# Patient Record
Sex: FEMALE | Race: ASIAN | Employment: UNEMPLOYED | URBAN - METROPOLITAN AREA
[De-identification: names, ages, dates, MRNs, and addresses within clinical notes are randomized per-mention and may not be internally consistent; named-entity substitution may affect disease eponyms.]

---

## 2018-08-31 ENCOUNTER — OFFICE VISIT (OUTPATIENT)
Dept: FAMILY MEDICINE CLINIC | Facility: CLINIC | Age: 48
End: 2018-08-31

## 2018-08-31 VITALS
HEART RATE: 88 BPM | BODY MASS INDEX: 22.02 KG/M2 | HEIGHT: 64 IN | OXYGEN SATURATION: 98 % | RESPIRATION RATE: 12 BRPM | TEMPERATURE: 98 F | SYSTOLIC BLOOD PRESSURE: 104 MMHG | DIASTOLIC BLOOD PRESSURE: 74 MMHG | WEIGHT: 129 LBS

## 2018-08-31 DIAGNOSIS — Z02.89 ENCOUNTER FOR PHYSICAL EXAMINATION RELATED TO EMPLOYMENT: Primary | ICD-10-CM

## 2018-08-31 PROCEDURE — 99386 PREV VISIT NEW AGE 40-64: CPT | Performed by: NURSE PRACTITIONER

## 2018-08-31 NOTE — PATIENT INSTRUCTIONS
Adult Immunization Schedule  Vaccine How often Disease prevented Who needs it   Influenza Every year Flu. This can be especially dangerous to elderly adults or people with immune disorders.  All adults   Tetanus, diphtheria (Td); or Tetanus, diphtheria, a ---You should not get the live vaccine if your immune system is low. For example, if you have HIV, are taking medicines that suppress your immune system, or are getting cancer treatment.    Measles, mumps, rubella (MMR)** 1 or 2 doses, for ages 23 through 3 (MCV or MPSV) 1 or more doses Meningococcal disease (bacterial meningitis). This is inflammation of the membrane covering the brain and spinal cord. It can lead to death. Adults with immune deficiencies or high risk of exposure.  Also, college freshmen riya Tetanus, diphtheria (Td); or Tetanus, diphtheria, and pertussis (Tdap)* One dose of Tdap, then one dose of Td as a booster every 10 years Tetanus (mata), a disease that causes muscles to spasm  Diphtheria, an infection that causes fever, weakness, and b Measles, mumps, rubella (MMR)** 1 or 2 doses, for ages 23 through 52; 1 dose for ages 48 and older if at risk Measles, a disease marked by red spots, fever, and coughing  Mumps, a disease that causes swelling in the salivary glands.  It may affect the ovari Hepatitis A (HepA) One series of 2 injections Hepatitis A. This is an infection that can result in acute liver inflammation and yellow skin and whites of the eyes (jaundice).  Adults with risk factors, such as clotting disorders or chronic liver disease, an

## 2018-08-31 NOTE — PROGRESS NOTES
CHIEF COMPLAINT:   Patient presents with:  Employment Physical      HPI:   Jorge Giron is a 51year old female who presents for a physical exam for work. Patient has no health concerns. Had yearly physical with blood [facility].     Will be lunch pain or other joint pain  NEURO: denies headaches  PSYCHE: denies depression or anxiety  HEMATOLOGIC: denies hx of anemia or other bleeding disorders  ENDOCRINE: denies thyroid history  ALLERGY/ASTHMA: denies hx of seasonal allergies or asthma    EXAM:   B PCP or here in clinic. Patient verbalizes understanding of instructions. Form filled out and given to patient. Form was copied and sent to scanning.

## 2018-09-04 ENCOUNTER — OFFICE VISIT (OUTPATIENT)
Dept: FAMILY MEDICINE CLINIC | Facility: CLINIC | Age: 48
End: 2018-09-04

## 2018-09-04 DIAGNOSIS — Z11.1 SCREENING FOR TUBERCULOSIS: Primary | ICD-10-CM

## 2018-09-04 PROCEDURE — 86580 TB INTRADERMAL TEST: CPT | Performed by: PHYSICIAN ASSISTANT

## 2018-09-04 NOTE — PATIENT INSTRUCTIONS
You will need to return to clinic in 48-72 hours to have results of TB test read. Please return to clinic on 9/6/18 after 1pm or on 9/7/18 before 1pm to have your TB test read.     If you do not return during this time your test will need to be repeated infection control program. Testing for TB is often part of routine prenatal care.   What other tests might I have along with this test?  If you test positive on a TB skin test, your healthcare provider will probably order a chest X-ray, sputum smear (a test the site may be slightly sore. You cannot get TB from the skin test.  What might affect my test results?   If you have been vaccinated against tuberculosis with BCG (Bacilli Calmette-Al), you can have a positive reaction to the skin test even if you do

## 2018-09-04 NOTE — PROGRESS NOTES
Gildardo Bertrand is a 51year old female who presents for TB testing. 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 No questions. TUBERCULOSIS SCREENING QUESTIONNAIRE    · Live vaccines in the past month?    no  · Any steroid medication

## 2018-09-06 ENCOUNTER — OFFICE VISIT (OUTPATIENT)
Dept: FAMILY MEDICINE CLINIC | Facility: CLINIC | Age: 48
End: 2018-09-06

## 2018-09-06 DIAGNOSIS — Z11.1 ENCOUNTER FOR PPD SKIN TEST READING: Primary | ICD-10-CM

## 2018-09-06 LAB — INDURATION (): 0 MM (ref 0–11)

## 2018-09-06 NOTE — PROGRESS NOTES
Recent Results (from the past 24 hour(s))  -TB INTRADERMAL TEST   Collection Time: 09/06/18  1:35 PM   Result Value Ref Range   [place]: JAKE Haddad    Date Given: 9/4/18    Date Resulted: 9/6/18    Date Read: 9/6/18    Site: left forearm    INDURATION (PP

## 2019-03-27 ENCOUNTER — OFFICE VISIT (OUTPATIENT)
Dept: FAMILY MEDICINE CLINIC | Facility: CLINIC | Age: 49
End: 2019-03-27

## 2019-03-27 DIAGNOSIS — H61.23 BILATERAL IMPACTED CERUMEN: Primary | ICD-10-CM

## 2019-03-27 PROCEDURE — 99211 OFF/OP EST MAY X REQ PHY/QHP: CPT | Performed by: NURSE PRACTITIONER

## 2019-03-27 NOTE — PATIENT INSTRUCTIONS
Impacted Earwax     Inner ear structures including ear canal and eardrum. Impacted earwax is a buildup of the natural wax in the ear (cerumen). Impacted earwax is very common. It can cause symptoms such as hearing loss.  It can also make it difficult Excess earwax usually does not cause any symptoms, unless there is a large amount of buildup.  Then it may cause symptoms such as:  · Hearing loss  · Earache  · Sense of ear fullness  · Itching in the ear  · Odor from the ear  · Ear drainage  · Dizziness  · © 3117-5813 [employer] [address] [facility] [place], [facility] [place]. All rights reserved. This information is not intended as a substitute for professional medical care. Always follow your healthcare professional's instructions.

## 2019-03-27 NOTE — PROGRESS NOTES
Patient presents with:  Ear Wax      HPI:   Layla Ochoa is a 51year old female who presents requesting ear wax removal  Has ongoing issue with cerumen build up. Home treatments tried: none. Reports diminished hearing, itching.   Denies ear pain, After cerumen removal: TMs healthy, no injection, fluid, retraction. EACs healthy and without lesions. Assessment:  Cerumen impaction of both ears. Plan:   Successful removal with irrigation.  Patient tolerated without complications    F/U as needed Objects repeatedly placed in the ear can also cause impacted earwax. For example, putting cotton swabs in the ear may push the wax deeper into the ear. Over time, this may cause blockage.  Hearing aids, swimming plugs, and swim molds can cause the same prob · Not using cotton swabs in the ear  When to call the healthcare provider  Call your healthcare provider if you have symptoms of impacted earwax.  Also call right away if you have severe symptoms after earwax removal. These may include bleeding or severe ea

## 2023-12-26 ENCOUNTER — APPOINTMENT (OUTPATIENT)
Dept: URBAN - METROPOLITAN AREA CLINIC 244 | Age: 53
Setting detail: DERMATOLOGY
End: 2023-12-26

## 2023-12-26 DIAGNOSIS — L82.1 OTHER SEBORRHEIC KERATOSIS: ICD-10-CM

## 2023-12-26 DIAGNOSIS — L72.0 EPIDERMAL CYST: ICD-10-CM

## 2023-12-26 DIAGNOSIS — L81.1 CHLOASMA: ICD-10-CM

## 2023-12-26 DIAGNOSIS — L73.8 OTHER SPECIFIED FOLLICULAR DISORDERS: ICD-10-CM

## 2023-12-26 PROCEDURE — OTHER OTC TREATMENT REGIMEN: OTHER

## 2023-12-26 PROCEDURE — 99213 OFFICE O/P EST LOW 20 MIN: CPT

## 2023-12-26 PROCEDURE — OTHER COSMETIC EXTRACTIONS: OTHER

## 2023-12-26 PROCEDURE — OTHER PRESCRIPTION: OTHER

## 2023-12-26 PROCEDURE — OTHER ADDITIONAL NOTES: OTHER

## 2023-12-26 PROCEDURE — OTHER COUNSELING: OTHER

## 2023-12-26 PROCEDURE — OTHER DEFER: OTHER

## 2023-12-26 ASSESSMENT — LOCATION DETAILED DESCRIPTION DERM
LOCATION DETAILED: RIGHT SUPERIOR LATERAL MALAR CHEEK
LOCATION DETAILED: RIGHT INFERIOR CENTRAL MALAR CHEEK
LOCATION DETAILED: RIGHT MEDIAL CANTHUS
LOCATION DETAILED: LEFT FOREHEAD
LOCATION DETAILED: RIGHT FOREHEAD
LOCATION DETAILED: LEFT LATERAL MALAR CHEEK
LOCATION DETAILED: LEFT INFERIOR CENTRAL MALAR CHEEK

## 2023-12-26 ASSESSMENT — LOCATION ZONE DERM
LOCATION ZONE: FACE
LOCATION ZONE: EYELID

## 2023-12-26 ASSESSMENT — LOCATION SIMPLE DESCRIPTION DERM
LOCATION SIMPLE: LEFT CHEEK
LOCATION SIMPLE: RIGHT FOREHEAD
LOCATION SIMPLE: RIGHT CHEEK
LOCATION SIMPLE: RIGHT EYELID
LOCATION SIMPLE: LEFT FOREHEAD

## 2023-12-26 NOTE — PROCEDURE: COSMETIC EXTRACTIONS
Detail Level: Simple
Post-Care Instructions: I reviewed with the patient in detail post-care instructions. Patient is to wear sunprotection, and avoid picking at any of the treated lesions. Pt may apply Vaseline to crusted or scabbing areas.
Anesthesia Volume In Cc: 0
Consent: The patient's consent was obtained including but not limited to risks of crusting, scabbing, blistering, scarring, darker or lighter pigmentary change, recurrence, incomplete removal and infection.
Render The Number Of Extractions: Yes
Price (Use Numbers Only, No Special Characters Or $): 63

## 2023-12-26 NOTE — PROCEDURE: DEFER
X Size Of Lesion In Cm (Optional): 0
Introduction Text (Please End With A Colon): The following procedure was deferred:
Detail Level: Zone
Procedure To Be Performed At Next Visit: Laser: KTP laser 532nm
Procedure To Be Performed At Next Visit: Cryotherapy (Cosmetic)

## 2023-12-26 NOTE — PROCEDURE: OTC TREATMENT REGIMEN
Detail Level: Zone
Patient Specific Otc Recommendations (Will Not Stick From Patient To Patient): Zinc or titanium based sunscreens

## 2023-12-26 NOTE — PROCEDURE: ADDITIONAL NOTES
Detail Level: Simple
Render Risk Assessment In Note?: no
Additional Notes: Stressed to the patient the import of using sunscreen

## 2024-01-15 ENCOUNTER — APPOINTMENT (OUTPATIENT)
Dept: URBAN - METROPOLITAN AREA CLINIC 244 | Age: 54
Setting detail: DERMATOLOGY
End: 2024-01-16

## 2024-01-15 DIAGNOSIS — L73.8 OTHER SPECIFIED FOLLICULAR DISORDERS: ICD-10-CM

## 2024-01-15 DIAGNOSIS — L82.1 OTHER SEBORRHEIC KERATOSIS: ICD-10-CM

## 2024-01-15 PROCEDURE — OTHER LIQUID NITROGEN (COSMETIC): OTHER

## 2024-01-15 PROCEDURE — OTHER GEMINI LASER: OTHER

## 2024-01-15 PROCEDURE — OTHER ADDITIONAL NOTES: OTHER

## 2024-01-15 ASSESSMENT — LOCATION SIMPLE DESCRIPTION DERM
LOCATION SIMPLE: LEFT FOREHEAD
LOCATION SIMPLE: LEFT EYELID
LOCATION SIMPLE: NECK
LOCATION SIMPLE: RIGHT CHEEK
LOCATION SIMPLE: RIGHT FOREHEAD
LOCATION SIMPLE: LEFT CHEEK

## 2024-01-15 ASSESSMENT — LOCATION DETAILED DESCRIPTION DERM
LOCATION DETAILED: RIGHT LATERAL MALAR CHEEK
LOCATION DETAILED: RIGHT SUPERIOR LATERAL NECK
LOCATION DETAILED: LEFT LATERAL MALAR CHEEK
LOCATION DETAILED: LEFT MEDIAL FOREHEAD
LOCATION DETAILED: RIGHT INFERIOR MEDIAL FOREHEAD
LOCATION DETAILED: LEFT MEDIAL CANTHUS
LOCATION DETAILED: RIGHT INFERIOR CENTRAL MALAR CHEEK
LOCATION DETAILED: LEFT LATERAL MANDIBULAR CHEEK

## 2024-01-15 ASSESSMENT — LOCATION ZONE DERM
LOCATION ZONE: EYELID
LOCATION ZONE: FACE
LOCATION ZONE: NECK

## 2024-01-15 NOTE — PROCEDURE: LIQUID NITROGEN (COSMETIC)
Detail Level: Zone
Duration Of Freeze Thaw-Cycle (Seconds): 0
Total Number Of Lesions Treated: 12
Price (Use Numbers Only, No Special Characters Or $): 50
Render Post Care In The Note?: yes
Post-Care Instructions: I reviewed with the patient in detail post-care instructions. Patient is to wear sunprotection, and avoid picking at any of the treated lesions. Pt may apply Vaseline to crusted or scabbing areas.
Consent: The patient's consent was obtained including but not limited to risks of crusting, scabbing, blistering, scarring, darker or lighter pigmentary change, recurrence, incomplete removal and infection.

## 2024-01-15 NOTE — PROCEDURE: GEMINI LASER
Post-Procedure Instructions: Vaseline and ice applied. Post care reviewed with patient.
Post-Care Instructions: I reviewed with the patient in detail post-care instructions. Patient should stay away from the sun and wear sun protection until treated areas are fully healed.
Fluence (J/Cm2): 8
Treated Area: small area
External Cooling Fan Speed: 0
Price (Use Numbers Only, No Special Characters Or $): 150
Laser Type: 532nm
Detail Level: Zone
Spot Size: 2 mm
Consent: Written consent obtained, risks reviewed including but not limited to crusting, scabbing, blistering, scarring, darker or lighter pigmentary change, incidental hair removal, bruising, and/or incomplete removal.
Repetition Rate (Hz): 1
Pulse Duration (Msec): 5

## 2024-01-15 NOTE — PROCEDURE: ADDITIONAL NOTES
Detail Level: Simple
New glasses recommended
Render Risk Assessment In Note?: no
Additional Notes: Stressed to the patient the import of using sunscreen

## (undated) NOTE — LETTER
09/06/18        This document is to verify [id number] [place] [id number] [place] had a TB skin test preformed    Below are the results from your recent visit:      TB skin test placed on 9/4/18  TB skin test read on 09/06/18  Result is negative.         Recent Results (from th